# Patient Record
Sex: FEMALE | Race: WHITE | Employment: FULL TIME | URBAN - METROPOLITAN AREA
[De-identification: names, ages, dates, MRNs, and addresses within clinical notes are randomized per-mention and may not be internally consistent; named-entity substitution may affect disease eponyms.]

---

## 2017-01-12 ENCOUNTER — ANESTHESIA EVENT (OUTPATIENT)
Dept: SURGERY | Facility: HOSPITAL | Age: 27
End: 2017-01-12

## 2017-01-13 ENCOUNTER — SURGERY (OUTPATIENT)
Age: 27
End: 2017-01-13

## 2017-01-13 ENCOUNTER — APPOINTMENT (OUTPATIENT)
Dept: GENERAL RADIOLOGY | Facility: HOSPITAL | Age: 27
DRG: 660 | End: 2017-01-13
Attending: UROLOGY
Payer: COMMERCIAL

## 2017-01-13 ENCOUNTER — ANESTHESIA (OUTPATIENT)
Dept: SURGERY | Facility: HOSPITAL | Age: 27
End: 2017-01-13

## 2017-01-13 PROBLEM — N13.5 UPJ (URETEROPELVIC JUNCTION) OBSTRUCTION: Status: ACTIVE | Noted: 2017-01-13

## 2017-01-13 PROCEDURE — 76000 FLUOROSCOPY <1 HR PHYS/QHP: CPT

## 2017-01-13 NOTE — ANESTHESIA PREPROCEDURE EVALUATION
PRE-OP EVALUATION    Patient Name: Clair Mejia    Pre-op Diagnosis: RIGHT UPJ OBSTRUCTION, RIGHT HYDRONEPHROSIS     Procedure(s):  CYSTOSCOPY, RIGHT RETROGRADE PYELOGRAM, RIGHT URETERAL STENT PLACEMENT, ROBOTIC ASSISTED LAPAROSCOPIC RIGHT PYELOPLASTY Alcohol Use: Yes  0.0 oz/week    0 Standard drinks or equivalent per week         Comment: 1-2 a month       Drug Use: No     Available pre-op labs reviewed.                Airway      Mallampati: I  Mouth opening: >3 FB  TM distance: 4 - 6 cm  Neck ROM: fu

## 2017-01-13 NOTE — ANESTHESIA POSTPROCEDURE EVALUATION
[facility] Patient Status:  Surgery Admit   Age/Gender 31year old female MRN [id number]   Location [address] Attending Koko Sifuentes MD   Hosp Day # 0 PCP None Pcp       Anesthesia Post-op Note

## 2018-01-08 PROCEDURE — 87491 CHLMYD TRACH DNA AMP PROBE: CPT | Performed by: OBSTETRICS & GYNECOLOGY

## 2018-01-08 PROCEDURE — 87591 N.GONORRHOEAE DNA AMP PROB: CPT | Performed by: OBSTETRICS & GYNECOLOGY

## 2018-01-08 PROCEDURE — 88175 CYTOPATH C/V AUTO FLUID REDO: CPT | Performed by: OBSTETRICS & GYNECOLOGY

## 2019-04-04 ENCOUNTER — OFFICE VISIT (OUTPATIENT)
Dept: FAMILY MEDICINE CLINIC | Facility: CLINIC | Age: 29
End: 2019-04-04
Payer: COMMERCIAL

## 2019-04-04 VITALS
TEMPERATURE: 99 F | SYSTOLIC BLOOD PRESSURE: 112 MMHG | RESPIRATION RATE: 16 BRPM | WEIGHT: 129 LBS | DIASTOLIC BLOOD PRESSURE: 64 MMHG | HEIGHT: 63 IN | HEART RATE: 87 BPM | BODY MASS INDEX: 22.86 KG/M2

## 2019-04-04 DIAGNOSIS — Z00.00 LABORATORY EXAMINATION ORDERED AS PART OF A ROUTINE GENERAL MEDICAL EXAMINATION: ICD-10-CM

## 2019-04-04 DIAGNOSIS — R94.6 ABNORMAL FINDING ON EXAMINATION OF THYROID GLAND: ICD-10-CM

## 2019-04-04 DIAGNOSIS — Z00.00 PHYSICAL EXAM, ANNUAL: Primary | ICD-10-CM

## 2019-04-04 DIAGNOSIS — Z13.89 SCREENING FOR GENITOURINARY CONDITION: ICD-10-CM

## 2019-04-04 PROCEDURE — 90471 IMMUNIZATION ADMIN: CPT | Performed by: FAMILY MEDICINE

## 2019-04-04 PROCEDURE — 90715 TDAP VACCINE 7 YRS/> IM: CPT | Performed by: FAMILY MEDICINE

## 2019-04-04 PROCEDURE — 99385 PREV VISIT NEW AGE 18-39: CPT | Performed by: FAMILY MEDICINE

## 2019-04-04 NOTE — PATIENT INSTRUCTIONS
Hepatitis A check if covered. Call [phone] to schedule US  thyroid. Return for fasting blood work. Healthy diet. Stay active. Schedule another visit to discuss your palpitations. In the meantime  You would have worsening symptoms go to ER.

## 2019-04-04 NOTE — PROGRESS NOTES
HPI:   Clair Mejia is a 33year old female who presents for a complete physical exam. Symptoms: denies discharge, itching, burning or dysuria. Patient complains of having occasional skipped beats/  Heart palpitations.   She will return for another vis Rfl: 11   Multiple Vitamins-Minerals (MULTIVITAMIN OR) Take by mouth daily.    Disp:  Rfl:    SULFASALAZINE 500 MG OR TABS 2 TABLETS BID Disp:  Rfl:    FOLIC ACID 1 MG OR TABS 1 TABLET DAILY Disp:  Rfl:       Past Medical History:   Diagnosis Date   • Anest otherwise  SKIN: denies any unusual skin lesions  EYES:denies blurred vision or double vision  HEENT: denies nasal congestion, sinus pain or ST  LUNGS: denies shortness of breath with exertion  CARDIOVASCULAR: denies chest pain on exertion, occasional skip active. Schedule another visit to discuss your palpitations. In the meantime  You would have worsening symptoms go to ER. Imaging & Consults:  None  Pap and pelvic done. Order put in for mammogram and dexascan. Self breast exam explained.  [employer]

## 2019-04-08 ENCOUNTER — APPOINTMENT (OUTPATIENT)
Dept: LAB | Age: 29
End: 2019-04-08
Attending: FAMILY MEDICINE
Payer: COMMERCIAL

## 2019-04-08 DIAGNOSIS — Z00.00 PHYSICAL EXAM, ANNUAL: Primary | ICD-10-CM

## 2019-04-08 DIAGNOSIS — Z00.00 LABORATORY EXAMINATION ORDERED AS PART OF A ROUTINE GENERAL MEDICAL EXAMINATION: ICD-10-CM

## 2019-04-08 DIAGNOSIS — R94.6 ABNORMAL FINDING ON EXAMINATION OF THYROID GLAND: ICD-10-CM

## 2019-04-08 DIAGNOSIS — Z00.00 PHYSICAL EXAM, ANNUAL: ICD-10-CM

## 2019-04-08 PROCEDURE — 83735 ASSAY OF MAGNESIUM: CPT | Performed by: FAMILY MEDICINE

## 2019-04-08 PROCEDURE — 80053 COMPREHEN METABOLIC PANEL: CPT | Performed by: FAMILY MEDICINE

## 2019-04-08 PROCEDURE — 80061 LIPID PANEL: CPT | Performed by: FAMILY MEDICINE

## 2019-04-08 PROCEDURE — 36415 COLL VENOUS BLD VENIPUNCTURE: CPT | Performed by: FAMILY MEDICINE

## 2019-04-08 PROCEDURE — 84443 ASSAY THYROID STIM HORMONE: CPT | Performed by: FAMILY MEDICINE

## 2019-04-17 ENCOUNTER — HOSPITAL ENCOUNTER (OUTPATIENT)
Dept: ULTRASOUND IMAGING | Age: 29
Discharge: HOME | End: 2019-04-17
Attending: FAMILY MEDICINE
Payer: COMMERCIAL

## 2019-04-17 DIAGNOSIS — R94.6 ABNORMAL FINDING ON EXAMINATION OF THYROID GLAND: ICD-10-CM

## 2019-04-17 PROCEDURE — 76536 US EXAM OF HEAD AND NECK: CPT | Performed by: FAMILY MEDICINE

## 2019-05-09 ENCOUNTER — OFFICE VISIT (OUTPATIENT)
Dept: FAMILY MEDICINE CLINIC | Facility: CLINIC | Age: 29
End: 2019-05-09
Payer: COMMERCIAL

## 2019-05-09 VITALS
HEART RATE: 80 BPM | BODY MASS INDEX: 23.04 KG/M2 | DIASTOLIC BLOOD PRESSURE: 68 MMHG | RESPIRATION RATE: 12 BRPM | TEMPERATURE: 97 F | HEIGHT: 63 IN | SYSTOLIC BLOOD PRESSURE: 110 MMHG | WEIGHT: 130 LBS

## 2019-05-09 DIAGNOSIS — R00.2 HEART PALPITATIONS: Primary | ICD-10-CM

## 2019-05-09 DIAGNOSIS — R94.31: ICD-10-CM

## 2019-05-09 DIAGNOSIS — I51.7 LEFT ATRIAL ENLARGEMENT: ICD-10-CM

## 2019-05-09 DIAGNOSIS — Z82.49 FAMILY HISTORY OF CORONARY ARTERY DISEASE IN MOTHER: ICD-10-CM

## 2019-05-09 PROCEDURE — 93000 ELECTROCARDIOGRAM COMPLETE: CPT | Performed by: FAMILY MEDICINE

## 2019-05-09 PROCEDURE — 99214 OFFICE O/P EST MOD 30 MIN: CPT | Performed by: FAMILY MEDICINE

## 2019-05-09 RX ORDER — PYRIDOXINE HCL (VITAMIN B6) 100 MG
TABLET ORAL
COMMUNITY
End: 2019-10-10 | Stop reason: ALTCHOICE

## 2019-05-09 NOTE — PROGRESS NOTES
Neda Lepe is a 33year old female. cc palpitations of the heart  HPI:   Patient is come to the office to discuss palpitation of the heart started January 1. She woke up with this in the morning.   Did not have too much alcohol the night before on th controlled at this time   • Crohn disease ([facility] 75.) 2000   • Migraines    • OTHER DISEASES     crohn's disease      Social History:  Social History    Tobacco Use      Smoking status: Never Smoker      Smokeless tobacco: Never Used    Alcohol use:  Yes      Alc Range    Glucose 88 70 - 99 mg/dL    Sodium 138 136 - 145 mmol/L    Potassium 4.0 3.5 - 5.1 mmol/L    Chloride 108 98 - 112 mmol/L    CO2 25.0 21.0 - 32.0 mmol/L    Anion Gap 5 0 - 18 mmol/L    BUN 10 7 - 18 mg/dL    Creatinine 0.62 0.55 - 1.02 mg/dL    BU

## 2019-05-09 NOTE — PATIENT INSTRUCTIONS
Call [phone] to schedule  echo of the heart and Holter monitor. Healthy diet. Keep good hydration. Further recommendation pending results of the testing.

## 2019-05-22 ENCOUNTER — HOSPITAL ENCOUNTER (OUTPATIENT)
Dept: CV DIAGNOSTICS | Facility: HOSPITAL | Age: 29
Discharge: HOME | End: 2019-05-22
Attending: FAMILY MEDICINE
Payer: COMMERCIAL

## 2019-05-22 DIAGNOSIS — I51.7 LEFT ATRIAL ENLARGEMENT: ICD-10-CM

## 2019-05-22 DIAGNOSIS — R94.31: ICD-10-CM

## 2019-05-22 DIAGNOSIS — R00.2 HEART PALPITATIONS: ICD-10-CM

## 2019-05-22 PROCEDURE — 93306 TTE W/DOPPLER COMPLETE: CPT | Performed by: FAMILY MEDICINE

## 2019-05-22 PROCEDURE — 93225 XTRNL ECG REC<48 HRS REC: CPT | Performed by: FAMILY MEDICINE

## 2019-05-22 PROCEDURE — 93227 XTRNL ECG REC<48 HR R&I: CPT | Performed by: FAMILY MEDICINE

## 2019-05-22 PROCEDURE — 93226 XTRNL ECG REC<48 HR SCAN A/R: CPT | Performed by: FAMILY MEDICINE

## 2019-06-13 ENCOUNTER — OFFICE VISIT (OUTPATIENT)
Dept: FAMILY MEDICINE CLINIC | Facility: CLINIC | Age: 29
End: 2019-06-13
Payer: COMMERCIAL

## 2019-06-13 VITALS
HEIGHT: 63 IN | BODY MASS INDEX: 23.57 KG/M2 | RESPIRATION RATE: 16 BRPM | DIASTOLIC BLOOD PRESSURE: 62 MMHG | OXYGEN SATURATION: 97 % | TEMPERATURE: 98 F | WEIGHT: 133 LBS | HEART RATE: 87 BPM | SYSTOLIC BLOOD PRESSURE: 104 MMHG

## 2019-06-13 DIAGNOSIS — E83.51 HYPOCALCEMIA: ICD-10-CM

## 2019-06-13 DIAGNOSIS — R00.2 HEART PALPITATIONS: Primary | ICD-10-CM

## 2019-06-13 PROCEDURE — 99213 OFFICE O/P EST LOW 20 MIN: CPT | Performed by: FAMILY MEDICINE

## 2019-06-14 NOTE — PATIENT INSTRUCTIONS
Continue calcium supplementation. Keep good hydration. Monitor symptoms. Recheck blood work for calcium when you have your next test with your GI.

## 2019-06-14 NOTE — PROGRESS NOTES
Julio Cedillo is a 33year old female. CC palpitation of the heart follow-up visit  HPI:   She is having skipped heartbeats.   Patient is coming to the office for follow-up of palpitations of the heart started to have those symptoms at the beginning of J oz      Frequency: 2-4 times a month      Drinks per session: 1 or 2      Binge frequency: Never      Comment: 1-2 a month    Drug use: No       REVIEW OF SYSTEMS:   GENERAL HEALTH: feels well otherwise  SKIN: denies any unusual skin lesions or rashes  VANDANA

## 2019-07-09 ENCOUNTER — TELEPHONE (OUTPATIENT)
Dept: SCHEDULING | Age: 29
End: 2019-07-09

## 2019-07-10 ENCOUNTER — WALK IN (OUTPATIENT)
Dept: URGENT CARE | Age: 29
End: 2019-07-10

## 2019-07-10 DIAGNOSIS — Z11.1 SCREENING-PULMONARY TB: Primary | ICD-10-CM

## 2019-07-10 PROCEDURE — 86580 TB INTRADERMAL TEST: CPT | Performed by: NURSE PRACTITIONER

## 2019-07-12 ENCOUNTER — WALK IN (OUTPATIENT)
Dept: URGENT CARE | Age: 29
End: 2019-07-12

## 2019-07-12 VITALS
HEIGHT: 62 IN | OXYGEN SATURATION: 95 % | TEMPERATURE: 98.1 F | HEART RATE: 88 BPM | BODY MASS INDEX: 23.92 KG/M2 | SYSTOLIC BLOOD PRESSURE: 110 MMHG | RESPIRATION RATE: 16 BRPM | DIASTOLIC BLOOD PRESSURE: 60 MMHG | WEIGHT: 129.96 LBS

## 2019-07-12 DIAGNOSIS — Z02.1 PHYSICAL EXAM, PRE-EMPLOYMENT: Primary | ICD-10-CM

## 2019-07-12 DIAGNOSIS — Z11.1 SCREENING FOR TUBERCULOSIS: ICD-10-CM

## 2019-07-12 LAB
INDURATION: NORMAL MM (ref 0–?)
SKIN TEST RESULT: NEGATIVE

## 2019-07-12 PROCEDURE — X0945 SELF PAY APN OR PA PERFORMED ADMINISTRATIVE PHYSICAL: HCPCS | Performed by: NURSE PRACTITIONER

## 2019-07-12 RX ORDER — FOLIC ACID 1 MG/1
1 TABLET ORAL DAILY
COMMUNITY

## 2019-07-12 RX ORDER — FAMOTIDINE 40 MG/1
40 TABLET, FILM COATED ORAL DAILY
COMMUNITY

## 2019-07-12 RX ORDER — MERCAPTOPURINE 50 MG/1
75 TABLET ORAL DAILY
COMMUNITY

## 2019-07-12 RX ORDER — SULFASALAZINE 500 MG/1
1000 TABLET ORAL 2 TIMES DAILY
COMMUNITY

## 2019-07-12 ASSESSMENT — ENCOUNTER SYMPTOMS
BACK PAIN: 0
PSYCHIATRIC NEGATIVE: 1
CONSTITUTIONAL NEGATIVE: 1
ABDOMINAL PAIN: 0
CONFUSION: 0
RESPIRATORY NEGATIVE: 1
GASTROINTESTINAL NEGATIVE: 1
VOMITING: 0
DIZZINESS: 0
NEUROLOGICAL NEGATIVE: 1
EYES NEGATIVE: 1
SHORTNESS OF BREATH: 0
FATIGUE: 0
FEVER: 0
WOUND: 0
SEIZURES: 0
DIARRHEA: 0
HEADACHES: 0

## 2019-10-10 ENCOUNTER — OFFICE VISIT (OUTPATIENT)
Dept: FAMILY MEDICINE CLINIC | Facility: CLINIC | Age: 29
End: 2019-10-10
Payer: COMMERCIAL

## 2019-10-10 VITALS
SYSTOLIC BLOOD PRESSURE: 122 MMHG | OXYGEN SATURATION: 97 % | DIASTOLIC BLOOD PRESSURE: 68 MMHG | HEIGHT: 63 IN | TEMPERATURE: 98 F | HEART RATE: 86 BPM | RESPIRATION RATE: 16 BRPM | WEIGHT: 133 LBS | BODY MASS INDEX: 23.57 KG/M2

## 2019-10-10 DIAGNOSIS — M54.2 NECK PAIN: Primary | ICD-10-CM
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
## 2019-10-10 DIAGNOSIS — M54.9 UPPER BACK PAIN: ICD-10-CM

## 2019-10-10 PROCEDURE — 99214 OFFICE O/P EST MOD 30 MIN: CPT | Performed by: FAMILY MEDICINE

## 2019-10-10 RX ORDER — NORETHINDRONE ACETATE AND ETHINYL ESTRADIOL .03; 1.5 MG/1; MG/1
1 TABLET ORAL NIGHTLY
COMMUNITY
End: 2020-01-29

## 2019-10-10 NOTE — PATIENT INSTRUCTIONS
Continue using Tylenol as needed. Can try topical Biofreeze or BenGay over-the-counter. Warm compresses. Call [phone] to schedule x-ray of your neck. Monitor symptoms.
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
## 2019-10-11 ENCOUNTER — HOSPITAL ENCOUNTER (OUTPATIENT)
Dept: GENERAL RADIOLOGY | Age: 29
Discharge: HOME | End: 2019-10-11
Attending: FAMILY MEDICINE
Payer: COMMERCIAL
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
## 2019-10-11 DIAGNOSIS — M54.2 NECK PAIN: ICD-10-CM

## 2019-10-11 DIAGNOSIS — M54.9 UPPER BACK PAIN: ICD-10-CM

## 2019-10-11 PROCEDURE — 72050 X-RAY EXAM NECK SPINE 4/5VWS: CPT | Performed by: FAMILY MEDICINE

## 2019-11-12 ENCOUNTER — MED REC SCAN ONLY (OUTPATIENT)
Dept: FAMILY MEDICINE CLINIC | Facility: CLINIC | Age: 29
End: 2019-11-12

## 2020-04-15 NOTE — PROGRESS NOTES
Virtual/Telephone Check-In    [address] Izaguirre verbally consents to a Virtual/Telephone Check-In service on 04/15/20. Patient understands and accepts financial responsibility for any deductible, co-insurance and/or co-pays associated with this service.  Nadeen Talavera too much about different things nearly every day she is becoming easily annoyed and irritated, moving 2 weeks more than half days pain patient is feeling nervous anxious on the edge having trouble to relax and feeling afraid that something awful might be h unspecified whether recurrent (HCC)  - Sertraline HCl 50 MG Oral Tab; Take one half of the tablet for 6 days,  then 1 tablet daily  Dispense: 30 tablet;  Refill: 0  - OP REFERRAL TO [facility]  Start sertraline 50 mg one half of the tablet daily for 6 days th

## 2020-05-11 DIAGNOSIS — F32.0 CURRENT MILD EPISODE OF MAJOR DEPRESSIVE DISORDER, UNSPECIFIED WHETHER RECURRENT (HCC): ICD-10-CM

## 2020-05-11 DIAGNOSIS — F41.1 GAD (GENERALIZED ANXIETY DISORDER): ICD-10-CM

## 2020-05-11 NOTE — TELEPHONE ENCOUNTER
LOV: 4/15/2020 anxiety televisit  Next appt: 5/15/2020    Sertraline  Last refill: 4/15/2020 30 tabs    Please refill if appropriate. Thank you.

## 2020-05-15 ENCOUNTER — TELEMEDICINE (OUTPATIENT)
Dept: FAMILY MEDICINE CLINIC | Facility: CLINIC | Age: 30
End: 2020-05-15

## 2020-05-15 DIAGNOSIS — F41.1 GAD (GENERALIZED ANXIETY DISORDER): ICD-10-CM

## 2020-05-15 DIAGNOSIS — F32.0 CURRENT MILD EPISODE OF MAJOR DEPRESSIVE DISORDER, UNSPECIFIED WHETHER RECURRENT (HCC): ICD-10-CM

## 2020-05-15 PROCEDURE — 99213 OFFICE O/P EST LOW 20 MIN: CPT | Performed by: FAMILY MEDICINE

## 2020-05-15 NOTE — PROGRESS NOTES
Virtual/Telephone Check-In    [address] Donora Dmitriy verbally consents to a Virtual/Telephone Check-In service on 05/15/20. Patient understands and accepts financial responsibility for any deductible, co-insurance and/or co-pays associated with this service.  Sulema Barthel mouth daily. 3 Package 4   • JUNEL 1.5/30 1.5-30 MG-MCG Oral Tab TAKE 1 TABLET BY MOUTH DAILY 84 tablet 0   • mercaptopurine 50 MG Oral Tab Take 75 mg by mouth. • Vitamin B-1 100 MG Oral Tab Take 100 mg by mouth daily.      • famotidine 40 MG Oral Tab 4

## 2020-10-03 DIAGNOSIS — F41.1 GAD (GENERALIZED ANXIETY DISORDER): ICD-10-CM

## 2020-10-03 DIAGNOSIS — F32.0 CURRENT MILD EPISODE OF MAJOR DEPRESSIVE DISORDER, UNSPECIFIED WHETHER RECURRENT (HCC): ICD-10-CM

## 2020-10-05 NOTE — TELEPHONE ENCOUNTER
Call from [facility] requesting status of sertraline refill request sent 10/3/2020. sts pt is out of medication. Advised of ofc refill protocol requesting 2-3 business days notice for refills. Will forward request to dr Tomeka Nj now.

## 2020-10-09 DIAGNOSIS — F41.1 GAD (GENERALIZED ANXIETY DISORDER): ICD-10-CM

## 2020-10-09 DIAGNOSIS — F32.0 CURRENT MILD EPISODE OF MAJOR DEPRESSIVE DISORDER, UNSPECIFIED WHETHER RECURRENT (HCC): ICD-10-CM

## 2020-10-12 DIAGNOSIS — F32.0 CURRENT MILD EPISODE OF MAJOR DEPRESSIVE DISORDER, UNSPECIFIED WHETHER RECURRENT (HCC): ICD-10-CM

## 2020-10-12 DIAGNOSIS — F41.1 GAD (GENERALIZED ANXIETY DISORDER): ICD-10-CM

## 2020-10-13 NOTE — TELEPHONE ENCOUNTER
SERTRALINE 50MG TABLETS    Non protocol medication. Please see pended medications. Please sign if appropriate. Thank you      Last OV: Virtual 05/15/2020    She has an upcoming appt scheduled for 11/20/2020.

## 2020-11-20 ENCOUNTER — OFFICE VISIT (OUTPATIENT)
Dept: FAMILY MEDICINE CLINIC | Facility: CLINIC | Age: 30
End: 2020-11-20
Payer: COMMERCIAL

## 2020-11-20 VITALS
RESPIRATION RATE: 16 BRPM | HEART RATE: 92 BPM | WEIGHT: 136 LBS | DIASTOLIC BLOOD PRESSURE: 62 MMHG | HEIGHT: 63 IN | BODY MASS INDEX: 24.1 KG/M2 | TEMPERATURE: 98 F | OXYGEN SATURATION: 97 % | SYSTOLIC BLOOD PRESSURE: 120 MMHG

## 2020-11-20 DIAGNOSIS — Z23 NEED FOR VACCINATION: ICD-10-CM

## 2020-11-20 DIAGNOSIS — F32.0 CURRENT MILD EPISODE OF MAJOR DEPRESSIVE DISORDER, UNSPECIFIED WHETHER RECURRENT (HCC): ICD-10-CM

## 2020-11-20 DIAGNOSIS — F41.1 GAD (GENERALIZED ANXIETY DISORDER): ICD-10-CM

## 2020-11-20 PROCEDURE — 3074F SYST BP LT 130 MM HG: CPT | Performed by: FAMILY MEDICINE

## 2020-11-20 PROCEDURE — 3008F BODY MASS INDEX DOCD: CPT | Performed by: FAMILY MEDICINE

## 2020-11-20 PROCEDURE — 99213 OFFICE O/P EST LOW 20 MIN: CPT | Performed by: FAMILY MEDICINE

## 2020-11-20 PROCEDURE — 90471 IMMUNIZATION ADMIN: CPT | Performed by: FAMILY MEDICINE

## 2020-11-20 PROCEDURE — 99072 ADDL SUPL MATRL&STAF TM PHE: CPT | Performed by: FAMILY MEDICINE

## 2020-11-20 PROCEDURE — 3078F DIAST BP <80 MM HG: CPT | Performed by: FAMILY MEDICINE

## 2020-11-20 PROCEDURE — 90686 IIV4 VACC NO PRSV 0.5 ML IM: CPT | Performed by: FAMILY MEDICINE

## 2020-11-20 NOTE — PROGRESS NOTES
Isabel Friedman is a 31year old female. cc anxiety/depression  HPI:   Patient is coming for follow-up of anxiety and depression. She is doing well with sertraline 50 mg daily twice medication well denies any side effects.   She feels that the medication nose no sore throat  Neck no neck pain  RESPIRATORY: denies shortness of breath with exertion  CARDIOVASCULAR: denies chest pain on exertion  GI: denies abdominal pain and denies heartburn  NEURO: denies headaches  Psych normal mood.     EXAM:   /62 (

## 2020-12-08 DIAGNOSIS — F41.1 GAD (GENERALIZED ANXIETY DISORDER): ICD-10-CM

## 2020-12-08 DIAGNOSIS — F32.0 CURRENT MILD EPISODE OF MAJOR DEPRESSIVE DISORDER, UNSPECIFIED WHETHER RECURRENT (HCC): ICD-10-CM

## 2021-04-20 ENCOUNTER — MED REC SCAN ONLY (OUTPATIENT)
Dept: FAMILY MEDICINE CLINIC | Facility: CLINIC | Age: 31
End: 2021-04-20

## 2021-05-10 ENCOUNTER — OFFICE VISIT (OUTPATIENT)
Dept: FAMILY MEDICINE CLINIC | Facility: CLINIC | Age: 31
End: 2021-05-10
Payer: COMMERCIAL

## 2021-05-10 VITALS
TEMPERATURE: 97 F | WEIGHT: 144 LBS | HEIGHT: 63 IN | SYSTOLIC BLOOD PRESSURE: 104 MMHG | RESPIRATION RATE: 14 BRPM | OXYGEN SATURATION: 97 % | DIASTOLIC BLOOD PRESSURE: 60 MMHG | HEART RATE: 97 BPM | BODY MASS INDEX: 25.52 KG/M2

## 2021-05-10 DIAGNOSIS — Z00.00 ROUTINE GENERAL MEDICAL EXAMINATION AT A HEALTH CARE FACILITY: Primary | ICD-10-CM

## 2021-05-10 DIAGNOSIS — F41.1 GAD (GENERALIZED ANXIETY DISORDER): ICD-10-CM

## 2021-05-10 PROCEDURE — 99395 PREV VISIT EST AGE 18-39: CPT | Performed by: FAMILY MEDICINE

## 2021-05-10 PROCEDURE — 3078F DIAST BP <80 MM HG: CPT | Performed by: FAMILY MEDICINE

## 2021-05-10 PROCEDURE — 3074F SYST BP LT 130 MM HG: CPT | Performed by: FAMILY MEDICINE

## 2021-05-10 PROCEDURE — 3008F BODY MASS INDEX DOCD: CPT | Performed by: FAMILY MEDICINE

## 2021-05-10 PROCEDURE — 99213 OFFICE O/P EST LOW 20 MIN: CPT | Performed by: FAMILY MEDICINE

## 2021-05-10 NOTE — PATIENT INSTRUCTIONS
Return for fasting blood work. Call [phone] to schedule  an appointment. Healthy diet. Stay active. Increase exercise. Continue sertraline at current dose.

## 2021-05-10 NOTE — PROGRESS NOTES
HPI:   Juancho Dolan is a 31year old female who presents for a complete physical exam. Symptoms: denies discharge, itching, burning or dysuria. She will return for fasting blood work. Patient is taking sertraline for anxiety doing well.   She is gett lb (61.7 kg)  10/10/19 : 133 lb (60.3 kg)  06/13/19 : 133 lb (60.3 kg)  05/09/19 : 130 lb (59 kg)    Body mass index is 25.51 kg/m².      Cholesterol, Total (mg/dL)   Date Value   04/08/2019 170     HDL Cholesterol (mg/dL)   Date Value   04/08/2019 62 (H) Cysto/stent removal - Dr Patrick Kelly      Family History   Problem Relation Age of Onset   • Cancer Father         BCC   • Other (Other) Father         Asthma   • Heart Disorder Mother         Heart attack status post kidney stone procedure   • Other (Other 04/14/2021   SpO2 97%   BMI 25.51 kg/m²   Body mass index is 25.51 kg/m².    GENERAL: well developed, well nourished,in no apparent distress  SKIN: no rashes,no suspicious lesions  HEENT: atraumatic, normocephalic,ears and throat are clear  EYES:DOROTEO NIETO

## 2021-05-25 DIAGNOSIS — F41.1 GAD (GENERALIZED ANXIETY DISORDER): ICD-10-CM

## 2021-05-27 NOTE — TELEPHONE ENCOUNTER
LOV 5/10/2021    No future appointments.     Sertraline HCl 50 MG Oral Tab 90 tablet 1 5/10/2021     DENIED AS DUPLICATE, INSTRUCTIONS TO PHARMACY TO CHECK FOR NEW/ REFILLS

## 2021-06-17 ENCOUNTER — MED REC SCAN ONLY (OUTPATIENT)
Dept: FAMILY MEDICINE CLINIC | Facility: CLINIC | Age: 31
End: 2021-06-17

## 2021-07-06 ENCOUNTER — MED REC SCAN ONLY (OUTPATIENT)
Dept: FAMILY MEDICINE CLINIC | Facility: CLINIC | Age: 31
End: 2021-07-06

## 2021-11-24 DIAGNOSIS — F41.1 GAD (GENERALIZED ANXIETY DISORDER): ICD-10-CM

## 2021-11-30 NOTE — TELEPHONE ENCOUNTER
Patient told me that she is moving to [facility]  during last visit refill is not appropriate. Please change PCP status in the chart.

## 2021-11-30 NOTE — TELEPHONE ENCOUNTER
SERTRALINE 50MG TABLETS    LOV: 05/10/2021 for Physical    UPCOMING APPT: N/A    LAST REFILL: 05/10/2021  QTY:  90 / 1 REFILLS    *sent Wan Shidao management message to make a med check appt*    RX pended, please review if applicable. Thank You!

## 2021-12-02 NOTE — TELEPHONE ENCOUNTER
Dr Mercedez Sommer, I updated PCP and please mychart message from patient and let me know what you'd like me to do. Thanks!

## 2021-12-02 NOTE — TELEPHONE ENCOUNTER
I have refilled patient medication send her to [place] for 1 month supply with 1 refill. I hope she will be able to get to new physician in January. Good luck to her with everything.   Thank you

## 2022-02-05 DIAGNOSIS — F41.1 GAD (GENERALIZED ANXIETY DISORDER): ICD-10-CM
